# Patient Record
(demographics unavailable — no encounter records)

---

## 2019-02-28 DIAGNOSIS — N39.0 RECURRENT UTI: Primary | ICD-10-CM

## 2019-02-28 NOTE — TELEPHONE ENCOUNTER
Patient scheduled for 3/28/19 with Keenan Stanton    Script for Macrodantin (nitrofurantoin macro) 50mg 1 capsule PO post-coital #30 with NO refills was queued and forwarded to MANNY Stanton for approval

## 2019-02-28 NOTE — TELEPHONE ENCOUNTER
Current request is for Macrodantin (nitrofurantoin) 50mg 1 PO after intercourse #90 with 1 refill  Per Urochart, patient was last seen on 6/30/2017 and is overdue for another office visit  Notes very clearly state patient is to return in one year  I left a message for the patient to call and schedule an office visit prior to the prescription being issued  I await a return phone call before taking further action

## 2019-03-01 RX ORDER — NITROFURANTOIN MACROCRYSTALS 50 MG/1
CAPSULE ORAL
Qty: 30 CAPSULE | Refills: 0 | Status: SHIPPED | OUTPATIENT
Start: 2019-03-01